# Patient Record
Sex: MALE | Race: WHITE | Employment: OTHER | URBAN - METROPOLITAN AREA
[De-identification: names, ages, dates, MRNs, and addresses within clinical notes are randomized per-mention and may not be internally consistent; named-entity substitution may affect disease eponyms.]

---

## 2019-11-07 ENCOUNTER — HOSPITAL ENCOUNTER (EMERGENCY)
Facility: CLINIC | Age: 74
Discharge: LEFT BEFORE DISCHARGE | End: 2019-11-07
Attending: EMERGENCY MEDICINE | Admitting: EMERGENCY MEDICINE
Payer: COMMERCIAL

## 2019-11-07 VITALS
WEIGHT: 165 LBS | HEIGHT: 61 IN | OXYGEN SATURATION: 100 % | RESPIRATION RATE: 16 BRPM | HEART RATE: 67 BPM | BODY MASS INDEX: 31.15 KG/M2 | SYSTOLIC BLOOD PRESSURE: 158 MMHG | TEMPERATURE: 97.6 F | DIASTOLIC BLOOD PRESSURE: 91 MMHG

## 2019-11-07 DIAGNOSIS — S01.01XA LACERATION OF SCALP WITHOUT FOREIGN BODY, INITIAL ENCOUNTER: ICD-10-CM

## 2019-11-07 DIAGNOSIS — R55 SYNCOPE, UNSPECIFIED SYNCOPE TYPE: ICD-10-CM

## 2019-11-07 DIAGNOSIS — Z53.29 LEFT AGAINST MEDICAL ADVICE: ICD-10-CM

## 2019-11-07 PROCEDURE — 99282 EMERGENCY DEPT VISIT SF MDM: CPT

## 2019-11-07 ASSESSMENT — ENCOUNTER SYMPTOMS
SHORTNESS OF BREATH: 0
NECK PAIN: 0
LIGHT-HEADEDNESS: 1
PALPITATIONS: 0
BACK PAIN: 0
HEADACHES: 0
WOUND: 1

## 2019-11-07 ASSESSMENT — MIFFLIN-ST. JEOR: SCORE: 1366.9

## 2019-11-07 NOTE — ED AVS SNAPSHOT
Emergency Department  [address] [facility] [address]  Phone:  [phone]  Fax:  [phone]                                    Mic Jean Baptiste   MRN: [id number]    Department:   Emergency Department   Date of Visit:  11/7/2019           After Visit Summary Signature Page    I have received my discharge instructions, and my questions have been answered. I have discussed any challenges I see with this plan with the nurse or doctor.    ..........................................................................................................................................  Patient/Patient Representative Signature      ..........................................................................................................................................  Patient Representative Print Name and Relationship to Patient    ..................................................               ................................................  Date                                   Time    ..........................................................................................................................................  Reviewed by Signature/Title    ...................................................              ..............................................  Date                                               Time          22EPIC Rev 08/18

## 2019-11-07 NOTE — ED TRIAGE NOTES
Pt [place] at mall to have watch fixed, they broke it and pt got upset. Pt was walking away and had syncopal episode. Pt fell and hit back of head. Lac to back of head Pt reports to medics that he has a condition that he takes medication for so he doesn't pass out. Pt not wanting to be in the ED and not forthcoming with past medical hx

## 2019-11-07 NOTE — ED NOTES
RN present during MD assessment. Patient refused assessments and care. MD discussed benefits and risks of leaving AMA including death from internal bleeding in the head and heart arrhythmias. Patient was concerned about costs, MD discussed standards of care such as a CT and EKG, as well as simple assessments such as laceration assessment, listening to heart and lungs. Patient alert and oriented x4, denies drinking or taking any drugs, continues to refuse care. MD preparing AMA paperwork per patient's request.

## 2019-11-07 NOTE — DISCHARGE INSTRUCTIONS
You are leaving against medical against. You are refusing labs, CT, EKG, tetanus booster, wound cleaning and repair and all other treatments.  You are risking permanent disability or death without these testings including severe diagnoses of intracranial hemorrhage, skull fracture, lethal cardiac arrhythmia, hypoglycemia, et. al.   Return if you develop any new symptoms or concerns or if you change your mind regarding testing or treatment. [facility] You can even proceed there immediately if you wish.

## 2019-11-07 NOTE — ED PROVIDER NOTES
History     Chief Complaint:  Loss of Consciousness and Head Laceration     The history is provided by the patient.      Mic Jean Baptiste is a 74 year old male presenting via EMS for evaluation following a loss of consciousness with a head laceration. Just prior to arrival, Mic was at the mall when he started to feel lightheaded. He then lost consciousness and fell to the ground, striking the back of his head and sustaining a laceration to his scalp. He denies any other injuries with this fall and currently denies any complaints including headache, visual disturbance, neck pain, or back pain. He denies prodromal chest pain, shortness of breath, or palpitations and believes this episode was due to not having much to eat today as he skipped breakfast, poor sleep, and feeling frustrated at the mall related to issues getting his watch repaired. EMS were called and brought Mic here for evaluation which he is unhappy about. He is adamant he does not want any medical testing, wound cares, or other services stating he would prefer to go to the [facility] for financial reasons if interventions were needed, but he does not feel any are. He denies any significant medical history and states that he only takes vitamin D supplements. He does not know when his tetanus status was last updated. He reports that he has previously had syncopal episodes under similar circumstances. He denies any alcohol or drug use.     Allergies:  NKDA      Medications:    Vitamin D      Past Medical History:    The patient denies any relevant past medical history.     Past Surgical History:    History reviewed. No pertinent past surgical history.     Family History:    History reviewed. No pertinent family history.     Social History:  Tobacco use:    Never smoker   Alcohol use:    Negative   Drug use:    Negative   Marital status:    Single   Accompanied to ED by:  EMS      Review of Systems   Eyes: Negative for visual disturbance.   Respiratory:  "Negative for shortness of breath.    Cardiovascular: Negative for chest pain and palpitations.   Musculoskeletal: Negative for arthralgias, back pain, gait problem and neck pain.   Skin: Positive for wound (scalp laceration ).   Neurological: Positive for syncope and light-headedness. Negative for headaches.   Psychiatric/Behavioral: Negative for confusion.   All other systems reviewed and are negative.    Physical Exam     Patient Vitals for the past 24 hrs:   BP Temp Temp src Pulse Resp SpO2 Height Weight   11/07/19 1438 (!) 158/91 97.6  F (36.4  C) Temporal 67 16 100 % 1.558 m (5' 1.32\") 74.8 kg (165 lb)      Physical Exam  General: Well-developed and well-nourished. Well appearing elderly  man. Uncooperative.  Head:  Linear laceration at least 1.5 cm on length in the left occipital scalp.  Eyes:  Conjunctivae, lids, and sclerae are normal.  ENT:    Normal nose. Moist mucous membranes.  Neck:  Supple. Normal range of motion.  CV:  Regular rate and rhythm. Normal heart sounds with no murmurs, rubs, or gallops detected.  Resp:  No respiratory distress. Clear to auscultation bilaterally without decreased breath sounds, wheezing, rales, or rhonchi.  GI:  Refused assessment.    MS:  Normal gait. Otherwise refused assessment.  Skin:  Warm. Non-diaphoretic. No pallor. Laceration as above.  Neuro: Awake. A&Ox3. Normal strength.  Psych:  Angry mood and affect. Normal speech.  Not responding to internal stimuli.  Vitals reviewed.    Emergency Department Course     Emergency Department Course:  Patient was brought to the ED via EMS.     Nursing notes and vitals reviewed.  1505: I performed an exam of the patient as documented above.     1540: I updated and reassessed the patient. We discussed the risks and benefits of further treatment and again the patient refused any workup or cares.     At this point, Mic Jean Baptiste refused my recommended care and insisted upon discharge. I discussed with the patient my diagnostic " impression, recommended treatment, and alternatives to treatment. I discussed with them what I anticipated could happen if the patient were discharged including permanent disability and death. The patient was able to understand this explanation and ask appropriate questions. In my opinion this patient does have capacity to decide to leave against medical advice, and I have asked him to sign a form indicating that decision. I have given the patient thorough discharge instructions and have prescribed all appropriate treatment. I have invited the patient to return here at any time if he decides to have further evaluation or treatment, regardless of his ability to pay.     Impression & Plan      Medical Decision Making:  Mic is a 74-year-old man who presents after a syncopal episode with a head laceration.  He preemptively tells me that this does not need to be evaluated as it was simply due to him getting upset about his watch being broken and having very little to eat today.  He denies any current concerns including headache or vision changes.  He denies any prodromal symptoms including chest pain, palpitations, or shortness of breath.  He is quite uncooperative throughout the interview and will not even sit in the bed for examination, change into a gown, or allow glucose testing or EKG.  I was able to convince him to let me listen to his heart and lungs which is normal, and he briefly allowed me to look at his laceration which does appear to need apryl.  However, Mic is quite adamant that he does not want any testing or treatment in the emergency department.  He had many questions about staples versus stitches but ultimately would not even let me do staples or the ED technician to clean the wound.  He is worried about the cost of this visit and states [facility].  He agrees to [facility] to the [facility] or return to this ER if he is having worsening of symptoms but adamantly  denies my recommendations for standard of care medical work-up for syncope and scalp laceration. Mic is alert and oriented x3.  He has had not been using drugs or alcohol and does not appear intoxicated.  He is ambulatory with a steady gait and verbalizes understanding of the risks of leaving AGAINST MEDICAL ADVICE.  I had a long conversation with Mic about the risks of leaving without recommended testing and treatment including, but not limited to, wound infection, skull fracture, intracranial hemorrhage, lethal arrhythmias, anemia, electrolyte derangements, kidney injury, and recurrent syncope all of which could lead to permanent disability or death.  Mic was able to state these risks back to me and accept these risks and insists that he be discharged AGAINST MEDICAL ADVICE.  He does understand he is welcome to return should he change his mind and also I have recommended [facility] should [facility] if he would [facility] get his care [facility]. He indicates he will instead care for himself at home.  All of Mic's questions were answered and he verbalized understanding that he is leaving AGAINST MEDICAL ADVICE without recommended testing and treatment accepting risks up to and including permanent disability and death.    Diagnosis:    ICD-10-CM   1. Syncope, unspecified syncope type R55   2. Laceration of scalp without foreign body, initial encounter S01.01XA   3. Left against medical advice Z53.20      Disposition:  Discharged against medical advice.     I, Madi Cullen, am serving as a scribe at 3:05 PM on 11/7/2019 to document services personally performed by Dr. Vickie Xiong, based on my observations and the provider's statements to me.     EMERGENCY DEPARTMENT       Vickie Xiong MD  11/08/19 1116

## 2019-11-07 NOTE — ED NOTES
Bed: ED27  Expected date:   Expected time:   Means of arrival:   Comments:  gordo - 74 m syncopal head lac eta 1422

## 2019-11-08 ASSESSMENT — ENCOUNTER SYMPTOMS
CONFUSION: 0
ARTHRALGIAS: 0